# Patient Record
Sex: FEMALE | Race: WHITE | Employment: OTHER | URBAN - METROPOLITAN AREA
[De-identification: names, ages, dates, MRNs, and addresses within clinical notes are randomized per-mention and may not be internally consistent; named-entity substitution may affect disease eponyms.]

---

## 2022-11-21 ENCOUNTER — TELEMEDICINE (OUTPATIENT)
Dept: NEUROLOGY | Age: 62
End: 2022-11-21

## 2022-11-21 DIAGNOSIS — G47.33 OSA (OBSTRUCTIVE SLEEP APNEA): Primary | ICD-10-CM

## 2022-11-21 PROCEDURE — 99202 OFFICE O/P NEW SF 15 MIN: CPT | Performed by: PSYCHIATRY & NEUROLOGY

## 2022-11-21 ASSESSMENT — ENCOUNTER SYMPTOMS
ALLERGIC/IMMUNOLOGIC NEGATIVE: 1
RESPIRATORY NEGATIVE: 1
EYES NEGATIVE: 1

## 2022-11-21 NOTE — PROGRESS NOTES
Dieudonne 56, [address]  [facility], [address]  Phone: [phone] Fax [phone]  Dr. Abdirizak Gee was in the office While conducting this encounter. She and/ or her healthcare decision maker is aware that this patient-initialed Telehealth encounter is a billable service with coverage as determined by her insurance carrier. She is aware that she may receive a bill and has provided verbal consent to proceed: Yes    The Virtual visit was conducted telephone encounter only. -  I affirm this is a Patient Initiated Episode with an Established Patient who has not had a related appointment within my department in the past 7 days or scheduled within the next 24 hours. Note: this encounter is not billable if this call serves to triage the patient into an appointment for the relevant concern. Total Time:  minutes: 20-29 minutes        11/21/2022  Brayan Hand     Patient is referred by the following provider for consultation regarding as below:       I reviewed the available records and notes and have examined patient with the following findings:     Chief Complaint:  No chief complaint on file. HPI: This is a right handed 65 y.o.  female very pleasant very appropriate patient who is being seen for sleep apnea syndrome. She was diagnosed originally in 2015 after she had excessive daytime hypersomnia waking up exhausted and fatigued she would wake up choking her self and snoring so loud she would wake herself up. And a bed partner could barely sleep with her. She was exhausted fatigue poor concentration. She did not feel good. She her dentist picked up on and in 2015 had a home sleep study test showing an apnea-hypopnea index of 11. And she carries currently an EPSS scale 7. She did great with the oral appliance they had had at [facility] except is broken in the last few years so it made at 7 years.   As per your she also bites her and grinds her teeth excessively hard. And so she is a bite guard as well. Which is why she probably broke her oral appliance. But while she was on the oral appliance no snoring good sleep wake up refreshed no gutter sounds and no choking. And no vivid no more vivid dreams. We do have the sleep study from them. She does take naps occasionally. IMAGING REVIEW:  I REVIEWED PERTINENT  IMAGES AND REPORTS WITH THE PATIENT PERSONALLY, DIRECTLY AND FULLY. Past Medical History:  No past medical history on file. Past Surgical History:  No past surgical history on file. Social History:  Social History     Socioeconomic History    Marital status: Single     Spouse name: Not on file    Number of children: Not on file    Years of education: Not on file    Highest education level: Not on file   Occupational History    Not on file   Tobacco Use    Smoking status: Not on file    Smokeless tobacco: Not on file   Substance and Sexual Activity    Alcohol use: Not on file    Drug use: Not on file    Sexual activity: Not on file   Other Topics Concern    Not on file   Social History Narrative    Not on file     Social Determinants of Health     Financial Resource Strain: Not on file   Food Insecurity: Not on file   Transportation Needs: Not on file   Physical Activity: Not on file   Stress: Not on file   Social Connections: Not on file   Intimate Partner Violence: Not on file   Housing Stability: Not on file       Family History:   No family history on file. No current outpatient medications on file prior to visit. No current facility-administered medications on file prior to visit. Not on File    Review of Systems:  Review of Systems   Constitutional:  Positive for fatigue. HENT: Negative. Eyes: Negative. Respiratory: Negative. Cardiovascular: Negative. Endocrine: Negative. Genitourinary: Negative. Allergic/Immunologic: Negative. Neurological: Negative. Hematological: Negative.      No flowsheet data found. No flowsheet data found. There were no vitals filed for this visit. Physical Exam  Neurological:      Mental Status: She is alert and oriented to person, place, and time. Neurologic Exam     Mental Status   Oriented to person, place, and time. Attention: normal. Concentration: normal.   Level of consciousness: alert  Knowledge: good. Her speech is clear and concise. Assessment   Assessment / Plan:    Diagnoses and all orders for this visit:    VISH (obstructive sleep apnea) it clearly appears the patient does have obstructive sleep apnea was previously diagnosed prior to this evaluation and of course 2015. She did great with the oral appliance and refuses a CPAP. The oral appliance is worn out so we need to move forward because she is becoming more symptomatic as mentioned above. We will proceed with a home sleep study test and then consider an oral appliance based off the results. The Diagnosis and differential diagnostic considerations, and Rx Tx were reviewed with the patient at length. No orders of the defined types were placed in this encounter. I have spent greater than 50% of visit discussing and counseling of patient  for treatment and diagnostic plan review. Total time 30 min     . Notes: Patient is to continue all medications as directed by prescribing physicians. Continuations on today's visit are made based on the patient's report of current medications.              Dr. Kvng Marie  Consultation Neurology, Neurodiagnostics and Neurotherapeutics  Neuroelectrophysiology, EEG, EMG  [facility]  [address] [place] [address], [facility]  Phone:  [phone]  Fax:   [phone]

## 2023-01-03 ENCOUNTER — CLINICAL DOCUMENTATION (OUTPATIENT)
Dept: NEUROLOGY | Age: 63
End: 2023-01-03

## 2023-01-26 ENCOUNTER — CLINICAL DOCUMENTATION (OUTPATIENT)
Dept: NEUROLOGY | Age: 63
End: 2023-01-26

## 2023-01-26 NOTE — PROGRESS NOTES
I spoke with the patient and she is aware that she has moderate to severe sleep apnea syndrome she is done really well with the oral appliance in the past and has no intentions of using CPAP. So organ to move forward with trying to get the oral appliance for the patient.